# Patient Record
Sex: FEMALE | Race: WHITE | URBAN - METROPOLITAN AREA
[De-identification: names, ages, dates, MRNs, and addresses within clinical notes are randomized per-mention and may not be internally consistent; named-entity substitution may affect disease eponyms.]

---

## 2017-12-26 NOTE — PATIENT DISCUSSION
Patient instructed to STOP ACTIVITIES that could result in INJURY do to VISUAL IMPAIRMENT during the episode. If driving, instructed to pull over and wait till vision clears.

## 2017-12-26 NOTE — PATIENT DISCUSSION
Reviewed that they tend to [address] over a few weeks, and then may not happen for many years. Patient instructed to return if they do not clear over a few weeks for further evaluation.

## 2018-06-04 ENCOUNTER — IMPORTED ENCOUNTER (OUTPATIENT)
Dept: URBAN - METROPOLITAN AREA CLINIC 9 | Facility: CLINIC | Age: 63
End: 2018-06-04

## 2019-03-18 ENCOUNTER — IMPORTED ENCOUNTER (OUTPATIENT)
Dept: URBAN - METROPOLITAN AREA CLINIC 9 | Facility: CLINIC | Age: 64
End: 2019-03-18

## 2019-03-22 ENCOUNTER — IMPORTED ENCOUNTER (OUTPATIENT)
Dept: URBAN - METROPOLITAN AREA CLINIC 9 | Facility: CLINIC | Age: 64
End: 2019-03-22

## 2019-04-03 ENCOUNTER — IMPORTED ENCOUNTER (OUTPATIENT)
Dept: URBAN - METROPOLITAN AREA CLINIC 9 | Facility: CLINIC | Age: 64
End: 2019-04-03

## 2019-04-10 ENCOUNTER — IMPORTED ENCOUNTER (OUTPATIENT)
Dept: URBAN - METROPOLITAN AREA CLINIC 9 | Facility: CLINIC | Age: 64
End: 2019-04-10

## 2019-06-07 ENCOUNTER — IMPORTED ENCOUNTER (OUTPATIENT)
Dept: URBAN - METROPOLITAN AREA CLINIC 9 | Facility: CLINIC | Age: 64
End: 2019-06-07

## 2019-09-23 ENCOUNTER — IMPORTED ENCOUNTER (OUTPATIENT)
Dept: URBAN - METROPOLITAN AREA CLINIC 9 | Facility: CLINIC | Age: 64
End: 2019-09-23

## 2019-10-25 ENCOUNTER — IMPORTED ENCOUNTER (OUTPATIENT)
Dept: URBAN - METROPOLITAN AREA CLINIC 9 | Facility: CLINIC | Age: 64
End: 2019-10-25

## 2019-11-06 ENCOUNTER — IMPORTED ENCOUNTER (OUTPATIENT)
Dept: URBAN - METROPOLITAN AREA CLINIC 9 | Facility: CLINIC | Age: 64
End: 2019-11-06

## 2019-12-13 ENCOUNTER — IMPORTED ENCOUNTER (OUTPATIENT)
Dept: URBAN - METROPOLITAN AREA CLINIC 9 | Facility: CLINIC | Age: 64
End: 2019-12-13

## 2020-05-19 ENCOUNTER — IMPORTED ENCOUNTER (OUTPATIENT)
Dept: URBAN - METROPOLITAN AREA CLINIC 9 | Facility: CLINIC | Age: 65
End: 2020-05-19

## 2020-08-14 ENCOUNTER — IMPORTED ENCOUNTER (OUTPATIENT)
Dept: URBAN - METROPOLITAN AREA CLINIC 9 | Facility: CLINIC | Age: 65
End: 2020-08-14

## 2020-12-15 ENCOUNTER — IMPORTED ENCOUNTER (OUTPATIENT)
Dept: URBAN - METROPOLITAN AREA CLINIC 9 | Facility: CLINIC | Age: 65
End: 2020-12-15

## 2021-01-19 ENCOUNTER — IMPORTED ENCOUNTER (OUTPATIENT)
Dept: URBAN - METROPOLITAN AREA CLINIC 9 | Facility: CLINIC | Age: 66
End: 2021-01-19

## 2021-03-19 ENCOUNTER — IMPORTED ENCOUNTER (OUTPATIENT)
Dept: URBAN - METROPOLITAN AREA CLINIC 9 | Facility: CLINIC | Age: 66
End: 2021-03-19

## 2021-09-17 ENCOUNTER — IMPORTED ENCOUNTER (OUTPATIENT)
Dept: URBAN - METROPOLITAN AREA CLINIC 9 | Facility: CLINIC | Age: 66
End: 2021-09-17

## 2021-09-17 PROBLEM — H35.342: Noted: 2021-09-17

## 2021-09-17 PROBLEM — H25.11: Noted: 2021-09-17

## 2021-09-17 PROBLEM — D31.32: Noted: 2021-09-17

## 2021-09-17 PROBLEM — E11.9: Noted: 2021-09-17

## 2021-09-17 PROBLEM — H43.821: Noted: 2021-09-17

## 2021-10-18 ASSESSMENT — VISUAL ACUITY
OS_CC: 20/25 SN
OD_CC: 20/20 SN
OS_CC: 20/25 -2 SN
OD_SC: 20/200 SN
OD_CC: 20/20 SN
OD_CC: 20/25 + SN
OS_CC: 20/40 -2 SN
OD_CC: 20/20 SN
OD_CC: 20/25 +2 SN
OD_CC: 20/20 SN
OS_CC: 20/40 +2 SN
OS_SC: CF 1FT SN
OD_CC: 20/20 SN
OD_SC: 20/200 + SN
OD_CC: 20/25 +2 SN
OS_CC: 20/20 SN
OD_CC: 20/25 SN
OS_CC: 20/30 - SN
OS_CC: 20/30 SN
OS_CC: 20/25 -2 SN
OS_CC: 20/50 -2 SN
OS_CC: 20/200 +2 SN
OD_CC: 20/20 SN
OD_CC: 20/20 - SN
OD_CC: 20/20 SN
OS_CC: 20/80 - SN
OS_CC: 20/200 SN
OS_CC: 20/20 SN
OS_CC: 20/80 - SN
OD_CC: 20/25 SN
OD_CC: 20/20 SN
OD_CC: 20/25 + SN
OS_CC: 20/60 SN
OS_CC: 20/40 SN
OD_CC: 20/25 - SN

## 2021-10-18 ASSESSMENT — TONOMETRY
OS_IOP_MMHG: 14
OD_IOP_MMHG: 17
OD_IOP_MMHG: 12
OS_IOP_MMHG: 14
OD_IOP_MMHG: 15
OS_IOP_MMHG: 19
OD_IOP_MMHG: 19
OD_IOP_MMHG: 14
OD_IOP_MMHG: 17
OS_IOP_MMHG: 19
OS_IOP_MMHG: 18
OS_IOP_MMHG: 19
OS_IOP_MMHG: 36
OS_IOP_MMHG: 17
OD_IOP_MMHG: 17
OS_IOP_MMHG: 20
OS_IOP_MMHG: 17
OD_IOP_MMHG: 15
OS_IOP_MMHG: 18
OD_IOP_MMHG: 19
OD_IOP_MMHG: 20
OS_IOP_MMHG: 11
OS_IOP_MMHG: 14
OS_IOP_MMHG: 19
OD_IOP_MMHG: 15
OS_IOP_MMHG: 15
OS_IOP_MMHG: 18
OD_IOP_MMHG: 19
OD_IOP_MMHG: 9
OD_IOP_MMHG: 20

## 2022-09-21 ENCOUNTER — ESTABLISHED PATIENT (OUTPATIENT)
Dept: URBAN - METROPOLITAN AREA CLINIC 17 | Facility: CLINIC | Age: 67
End: 2022-09-21

## 2022-09-21 DIAGNOSIS — H40.1420: ICD-10-CM

## 2022-09-21 DIAGNOSIS — E11.9: ICD-10-CM

## 2022-09-21 DIAGNOSIS — H35.342: ICD-10-CM

## 2022-09-21 DIAGNOSIS — D31.32: ICD-10-CM

## 2022-09-21 DIAGNOSIS — H25.11: ICD-10-CM

## 2022-09-21 PROCEDURE — 92015 DETERMINE REFRACTIVE STATE: CPT

## 2022-09-21 PROCEDURE — 92133 CPTRZD OPH DX IMG PST SGM ON: CPT

## 2022-09-21 PROCEDURE — 92014 COMPRE OPH EXAM EST PT 1/>: CPT

## 2022-09-21 ASSESSMENT — TONOMETRY
OD_IOP_MMHG: 18
OS_IOP_MMHG: 18

## 2022-09-21 ASSESSMENT — VISUAL ACUITY
OS_CC: 20/50+1
OD_CC: 20/20
OS_PH: 20/30

## 2023-04-28 ENCOUNTER — ESTABLISHED PATIENT (OUTPATIENT)
Dept: URBAN - METROPOLITAN AREA CLINIC 17 | Facility: CLINIC | Age: 68
End: 2023-04-28

## 2023-04-28 DIAGNOSIS — H25.11: ICD-10-CM

## 2023-04-28 DIAGNOSIS — H35.342: ICD-10-CM

## 2023-04-28 DIAGNOSIS — D31.32: ICD-10-CM

## 2023-04-28 DIAGNOSIS — H43.813: ICD-10-CM

## 2023-04-28 PROCEDURE — 92014 COMPRE OPH EXAM EST PT 1/>: CPT

## 2023-04-28 PROCEDURE — 92134 CPTRZ OPH DX IMG PST SGM RTA: CPT

## 2023-04-28 ASSESSMENT — TONOMETRY
OS_IOP_MMHG: 13
OD_IOP_MMHG: 15

## 2023-04-28 ASSESSMENT — VISUAL ACUITY
OS_CC: 20/30
OD_CC: 20/25